# Patient Record
Sex: MALE | Race: WHITE
[De-identification: names, ages, dates, MRNs, and addresses within clinical notes are randomized per-mention and may not be internally consistent; named-entity substitution may affect disease eponyms.]

---

## 2023-11-03 ENCOUNTER — APPOINTMENT (OUTPATIENT)
Dept: ORTHOPEDIC SURGERY | Facility: CLINIC | Age: 28
End: 2023-11-03
Payer: SELF-PAY

## 2023-11-03 VITALS — BODY MASS INDEX: 24.5 KG/M2 | WEIGHT: 175 LBS | HEIGHT: 71 IN

## 2023-11-03 DIAGNOSIS — Z78.9 OTHER SPECIFIED HEALTH STATUS: ICD-10-CM

## 2023-11-03 PROBLEM — Z00.00 ENCOUNTER FOR PREVENTIVE HEALTH EXAMINATION: Status: ACTIVE | Noted: 2023-11-03

## 2023-11-03 PROCEDURE — 99204 OFFICE O/P NEW MOD 45 MIN: CPT | Mod: 57

## 2023-11-03 PROCEDURE — 26432 REPAIR FINGER TENDON: CPT

## 2023-12-15 ENCOUNTER — APPOINTMENT (OUTPATIENT)
Dept: ORTHOPEDIC SURGERY | Facility: CLINIC | Age: 28
End: 2023-12-15
Payer: OTHER GOVERNMENT

## 2023-12-15 VITALS — WEIGHT: 175 LBS | BODY MASS INDEX: 24.5 KG/M2 | HEIGHT: 71 IN

## 2023-12-15 DIAGNOSIS — S69.90XA UNSPECIFIED INJURY OF UNSPECIFIED WRIST, HAND AND FINGER(S), INITIAL ENCOUNTER: ICD-10-CM

## 2023-12-15 DIAGNOSIS — Z78.9 OTHER SPECIFIED HEALTH STATUS: ICD-10-CM

## 2023-12-15 PROCEDURE — 99024 POSTOP FOLLOW-UP VISIT: CPT

## 2023-12-15 NOTE — IMAGING
[de-identified] : Right middle finger with swelling, skin intact. 5-10 degree DIP extensor lag. Sensation intact at radial and ulnar aspects of pulp. <2sec cap refill.  Right middle finger radiographs from outside facility demonstrate avulsion fracture at base of distal phalanx dorsally.

## 2023-12-15 NOTE — HISTORY OF PRESENT ILLNESS
[de-identified] : Age: 28M PMHx: none Hand Dominance: RHD Chief Complaint: Right middle finger s/p trauma 11/01/23. Patient reports that he was playing basketball when he jammed his middle finger on the ball. Patient went to Urgent Care and had radiographs performed, advised of a fracture. Patient reports pain with movement, but none at rest. Denies numbness/tingling. Trauma: yes Outside Imaging/Treatment: X-rays at Urgent Care 11/01/23 OTC Medications: none  OT/PT: none Bracing: finger splint in place Pain worse with: movement Pain better with: rest  12/15/23: f/u right middle finger. Patient reports no pain or discomfort at this time. Patient reports that he has a slight bend to his finger but is doing well overall. Denies numbness/tingling.

## 2023-12-15 NOTE — ASSESSMENT
[FreeTextEntry1] : Right middle finger mallet - will manage with closed management [CPT 54550]  Reviewed radiographs and pathonatomy with patient. Script for DIP extension splint provided. Explained we will manage with 24/7 DIP extension splint for 6 weeks and night time splinting for 2-3 weeks thereafter. Discussed that any flexion at DIP during the 6 weeks runs risk of breaking up scar tissue. Discussed expectation of 5-10 degree extension lag and stiffness.  F/u 6weeks/prn